# Patient Record
Sex: FEMALE | Race: WHITE
[De-identification: names, ages, dates, MRNs, and addresses within clinical notes are randomized per-mention and may not be internally consistent; named-entity substitution may affect disease eponyms.]

---

## 2019-02-18 ENCOUNTER — HOSPITAL ENCOUNTER (EMERGENCY)
Dept: HOSPITAL 56 - MW.ED | Age: 24
Discharge: HOME | End: 2019-02-18
Payer: COMMERCIAL

## 2019-02-18 DIAGNOSIS — Z98.890: ICD-10-CM

## 2019-02-18 DIAGNOSIS — M62.830: ICD-10-CM

## 2019-02-18 DIAGNOSIS — Z88.0: ICD-10-CM

## 2019-02-18 DIAGNOSIS — N39.0: Primary | ICD-10-CM

## 2019-02-18 PROCEDURE — 87086 URINE CULTURE/COLONY COUNT: CPT

## 2019-02-18 PROCEDURE — 81025 URINE PREGNANCY TEST: CPT

## 2019-02-18 PROCEDURE — 96372 THER/PROPH/DIAG INJ SC/IM: CPT

## 2019-02-18 PROCEDURE — 81001 URINALYSIS AUTO W/SCOPE: CPT

## 2019-02-18 PROCEDURE — 99283 EMERGENCY DEPT VISIT LOW MDM: CPT

## 2019-02-18 PROCEDURE — 72100 X-RAY EXAM L-S SPINE 2/3 VWS: CPT

## 2019-02-18 NOTE — CR
EXAMINATION: Lumbar spine

 

HISTORY: Pain

 

COMPARISON: None

 

TECHNIQUE: AP and lateral views

 

FINDINGS: The lumbar spinal alignment is normal. The vertebral body heights and

disc spaces appear well-maintained. Bone mineralization is normal. The SI joints

are symmetric.

 

IMPRESSION: Grossly unremarkable lumbar spine.

## 2019-02-18 NOTE — EDM.PDOC
ED HPI GENERAL MEDICAL PROBLEM





- General


Chief Complaint: Back Pain or Injury


Stated Complaint: BACK PAIN


Time Seen by Provider: 02/18/19 09:46


Source of Information: Reports: Patient





- History of Present Illness


INITIAL COMMENTS - FREE TEXT/NARRATIVE: 





HISTORY AND PHYSICAL:


History of present illness:


[Patient presents with 5 out of 10 back pain low back pain no known injury, she 

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 

seeing chiropractor several times over the last 10 days with minimal 

improvement she has clear muscle spasm over right and left paraspinous muscles 

and infraspinatus on the left





No fever nausea vomiting chills sweats no known injury no footdrop saddle 

anesthesia bowel or urine symptoms


]


Review of systems: 


As per history of present illness and below otherwise all systems reviewed and 

negative.


Past medical history: 


As per history of present illness and as reviewed below otherwise 

noncontributory.


Surgical history: 


As per history of present illness and as reviewed below otherwise 

noncontributory.


Social history: 


No reported history of drug or alcohol abuse.


Family history: 


As per history of present illness and as reviewed below otherwise 

noncontributory.





Physical exam:


HEENT: Atraumatic, normocephalic, pupils reactive, negative for conjunctival 

pallor or scleral icterus, mucous membranes moist, throat clear, neck supple, 

nontender, trachea midline.


Lungs: Clear to auscultation, breath sounds equal bilaterally, chest nontender.


Heart: S1S2, regular, negative for clicks, rubs, or JVD.


Abdomen: Soft, nondistended, nontender. Negative for masses or 

hepatosplenomegaly. Negative for costovertebral tenderness.


Pelvis: Stable nontender.


Genitourinary: Deferred.


Rectal: Deferred.


Extremities: Atraumatic, negative for cords or calf pain. Neurovascular 

unremarkable.


Neuro: Awake, alert, oriented. Cranial nerves II through XII unremarkable. 

Cerebellum unremarkable. Motor and sensory unremarkable throughout. Exam 

nonfocal.





Diagnostics:


[UA with hCG


Lumbar spine


]


Therapeutics:


[Toradol 60 IM





Cataflam


Flexeril


Macrobid


]


Impression: 


[ back pain ]


para spinous muscle spasm


uti


Definitive disposition and diagnosis as appropriate pending reevaluation and 

review of above.


  ** Back


Pain Score (Numeric/FACES): 9





- Related Data


 Allergies











Allergy/AdvReac Type Severity Reaction Status Date / Time


 


Penicillins Allergy  throat Verified 02/18/19 09:40





   closes  











Home Meds: 


 Home Meds





. [No Known Home Meds]  02/18/19 [History]











Past Medical History


HEENT History: Reports: Other (See Below)


Other HEENT History: wears glasses/contacts


Musculoskeletal History: Reports: Fracture


Other Musculoskeletal History: hx fx toes, fingers, wrist, and nose


Neurological History: Reports: Migraines


Psychiatric History: Reports: Anxiety, Depression





- Past Surgical History


Head Surgeries/Procedures: Reports: None


HEENT Surgical History: Reports: Oral Surgery, Tonsillectomy





ED ROS GENERAL





- Review of Systems


Review Of Systems: See Below





ED EXAM, GENERAL





- Physical Exam


Exam: See Below





Course





- Vital Signs


Last Recorded V/S: 


 Last Vital Signs











Temp  98.1 F   02/18/19 09:41


 


Pulse  75   02/18/19 09:41


 


Resp  18   02/18/19 09:41


 


BP  118/80   02/18/19 09:41


 


Pulse Ox  98   02/18/19 09:41














- Orders/Labs/Meds


Orders: 


 Active Orders 24 hr











 Category Date Time Status


 


 CULTURE URINE [RM] Stat Lab  02/18/19 10:07 Received











Labs: 


 Laboratory Tests











  02/18/19 02/18/19 Range/Units





  10:07 10:07 


 


Urine Color  YELLOW   


 


Urine Appearance  CLEAR   


 


Urine pH  6.0   (5.0-8.0)  


 


Ur Specific Gravity  1.010   (1.001-1.035)  


 


Urine Protein  NEGATIVE   (NEGATIVE)  mg/dL


 


Urine Glucose (UA)  NEGATIVE   (NEGATIVE)  mg/dL


 


Urine Ketones  NEGATIVE   (NEGATIVE)  mg/dL


 


Urine Occult Blood  NEGATIVE   (NEGATIVE)  


 


Urine Nitrite  NEGATIVE   (NEGATIVE)  


 


Urine Bilirubin  NEGATIVE   (NEGATIVE)  


 


Urine Urobilinogen  0.2   (<2.0)  EU/dL


 


Ur Leukocyte Esterase  TRACE H   (NEGATIVE)  


 


Urine RBC  0-2   (0-2/HPF)  


 


Urine WBC  2-5   (0-5/HPF)  


 


Ur Epithelial Cells  MANY   (NONE-FEW)  


 


Urine Bacteria  2+ H   (NEGATIVE)  


 


Urine HCG, Qual   NEGATIVE  (NEGATIVE)  











Meds: 


Medications














Discontinued Medications














Generic Name Dose Route Start Last Admin





  Trade Name Freq  PRN Reason Stop Dose Admin


 


Ketorolac Tromethamine  60 mg  02/18/19 09:46  02/18/19 10:05





  Toradol  IM  02/18/19 09:47  60 mg





  ONETIME ONE   Administration





     





     





     





     














Departure





- Departure


Time of Disposition: 10:53


Disposition: Home, Self-Care 01


Condition: Good


Clinical Impression: 


 Spasm of lumbar paraspinous muscle, UTI (urinary tract infection)








- Discharge Information


Referrals: 


PCP,None [Primary Care Provider] - 


Forms:  ED Department Discharge


Additional Instructions: 


Medication as prescribed


Return if symptoms persist or worsen


Over-the-counter symptomatic therapy is discussed


Follow-up with primary care in 2 weeks sooner as needed





[facility] - Primary Care


[address] [place] [id number]


Phone: [phone]


Fax: [phone]








The following information is given to patients seen in the emergency department 

who are being discharged to home. This information is to outline your options 

for follow-up care. We provide all patients seen in our emergency department 

with a follow-up referral.





The need for follow-up, as well as the timing and circumstances, are variable 

depending upon the specifics of your emergency department visit.





If you don't have a primary care physician on staff, we will provide you with a 

referral. We always advise you to contact your personal physician following an 

emergency department visit to inform them of the circumstance of the visit and 

for follow-up with them and/or the need for any referrals to a consulting 

specialist.





The emergency department will also refer you to a specialist when appropriate. 

This referral assures that you have the opportunity for follow-up care with a 

specialist. All of these measure are taken in an effort to provide you with 

optimal care, which includes your follow-up.





Under all circumstances we always encourage you to contact your private 

physician who remains a resource for coordinating your care. When calling for 

follow-up care, please make the office aware that this follow-up is from your 

recent emergency room visit. If for any reason you are refused follow-up, 

please contact the [facility] emergency department at [phone] 

and asked to speak to the emergency department charge nurse.








- My Orders


Last 24 Hours: 


My Active Orders





02/18/19 10:07


CULTURE URINE [RM] Stat 














- Assessment/Plan


Last 24 Hours: 


My Active Orders





02/18/19 10:07


CULTURE URINE [RM] Stat

## 2020-04-09 ENCOUNTER — HOSPITAL ENCOUNTER (INPATIENT)
Dept: HOSPITAL 56 - MW.OB | Age: 25
LOS: 2 days | Discharge: HOME | DRG: 560 | End: 2020-04-11
Attending: OBSTETRICS & GYNECOLOGY | Admitting: OBSTETRICS & GYNECOLOGY
Payer: COMMERCIAL

## 2020-04-09 DIAGNOSIS — Z3A.40: ICD-10-CM

## 2020-04-09 DIAGNOSIS — O48.0: Primary | ICD-10-CM

## 2020-04-09 DIAGNOSIS — Z87.891: ICD-10-CM

## 2020-04-09 NOTE — PCM.PNLD
Labor Progress Note





- VS & Meds


Vital Signs: 


T 98.6F; HR 97; /52 (105), Pain 4/10





Active Medications: 


 Current Medications





Butorphanol Tartrate (Stadol)  1 mg IVPUSH Q1H PRN


   PRN Reason: Pain


Carboprost Tromethamine (Hemabate Ds)  250 mcg IM ASDIRECTED PRN


   PRN Reason: Post Partum Hemorrhage


Lactated Ringer's (Ringers, Lactated)  1,000 mls @ 150 mls/hr IV ASDIRECTED AINSLEY


   Last Admin: 04/09/20 16:48 Dose:  150 mls/hr


Oxytocin/Sodium Chloride (Oxytocin 30 Unit/500 Ml-Ns)  30 unit in 500 mls @ 999 

mls/hr IV TITRATE AINSLEY


Oxytocin/Sodium Chloride (Oxytocin 30 Unit/500 Ml-Ns)  30 unit in 500 mls @ 2 

mls/hr IV TITRATE AINSLEY; Protocol


Tranexamic Acid 1,000 mg/ (Sodium Chloride)  110 mls @ 660 mls/hr IV ONETIME PRN


   PRN Reason: Bleeding


Lidocaine HCl (Xylocaine 1%)  50 ml INJECT ONETIME PRN


   PRN Reason: Laceration repair


Methylergonovine Maleate (Methergine)  0.2 mg IM ASDIRECTED PRN


   PRN Reason: Post Partum Hemorrhage


Misoprostol (Cytotec)  200 mcg PO ONETIME PRN


   PRN Reason: Post Partum Hemorrhage


Misoprostol (Cytotec)  25 mcg VAG ONETIME PRN


   PRN Reason: Cervical Ripening


   Last Admin: 04/09/20 01:02 Dose:  25 mcg


Misoprostol (Cytotec)  25 mcg VAG Q4H PRN


   PRN Reason: Cervical Ripening


Morphine Sulfate (Morphine)  5 mg IVPUSH ONETIME PRN


   PRN Reason: Pain


   Last Admin: 04/09/20 16:42 Dose:  5 mg


Nalbuphine HCl (Nubain)  10 mg IVPUSH Q1H PRN


   PRN Reason: Pain (severe 7-10)


Promethazine HCl (Phenergan)  25 mg IM ONETIME PRN


   PRN Reason: Pain


   Last Admin: 04/09/20 16:40 Dose:  25 mg


Sodium Chloride (Saline Flush)  10 ml FLUSH ASDIRECTED PRN


   PRN Reason: Keep Vein Open


Sodium Chloride (Saline Flush)  2.5 ml FLUSH ASDIRECTED PRN


   PRN Reason: Keep Vein Open


Sodium Chloride (Normal Saline)  10 ml IV ASDIRECTED PRN


   PRN Reason: IV Use


Sterile Water (Sterile Water For Irrigation)  1,000 ml IRR ASDIRECTED PRN


   PRN Reason: delivery


Terbutaline Sulfate (Brethine)  0.25 mg SUBCUT ASDIRECTED PRN


   PRN Reason: Tacysystole











- Uterine Contractions


Uterine Monitoring Mode: External [place]


Contraction Frequency (min): 3-5


Contraction Duration (sec): 60-90


Contraction Intensity: Moderate


Uterine Resting Tone: Soft





- Fetal Monitoring


Fetal Monitor Mode: External Ultrasound


Fetal Heart Rate (FHR) Baseline: 135


Fetal Heart Rate (FHR) Variability: Moderate (6-25 bmp)


Fetal Accelerations: Present, 15x15


Fetal Decelerations: None, Variable


Fetal Strip Review: Category I





- Vaginal Exam


Dilation (cm): 4


Effacement (Percent): 100


Station: 0


Cervical Position: Midposition


Sterile Vaginal Exam Performed By: Tatyana Rogers


Vaginal Exam Comment: Cook catheter spontaneously expulled.  SVE 4/100/0.  

Uneventful AROM for augementation of labor.  Large clear fluid.  Fetal head 

well applied.  Tolerated well by fetus.  , moderate variability, 

positive accels.  Possible FHR deceleration with maternal position change to 

upright sitting position, maternal position change to semi-fowlers and EFM 

adjustment showed spontaneous recovery.  Continue with IOL plan of care.

## 2020-04-09 NOTE — PCM.PREANE
Preanesthetic Assessment





- Anesthesia/Transfusion/Family Hx


Anesthesia History: Prior Anesthesia Without Reaction


Family History of Anesthesia Reaction: No


Transfusion History: No Prior Transfusion(s)





- Physical Assessment


NPO Status Date: 04/09/20


NPO Status Time: 18:00


Height: 1.66 m


Weight: 91.626 kg


ASA Class: 1





- Lab


Values: 





 Laboratory Last Values











WBC  10.66 K/uL (4.0-11.0)   04/09/20  00:30    


 


RBC  4.30 M/uL (4.30-5.90)   04/09/20  00:30    


 


Hgb  12.5 g/dL (12.0-16.0)   04/09/20  00:30    


 


Hct  37.1 % (36.0-46.0)   04/09/20  00:30    


 


MCV  86.3 fL (80.0-98.0)   04/09/20  00:30    


 


MCH  29.1 pg (27.0-32.0)   04/09/20  00:30    


 


MCHC  33.7 g/dL (31.0-37.0)   04/09/20  00:30    


 


RDW Std Deviation  42.0 fl (28.0-62.0)   04/09/20  00:30    


 


RDW Coeff of Clau  14 % (11.0-15.0)   04/09/20  00:30    


 


Plt Count  188 K/uL (150-400)   04/09/20  00:30    


 


MPV  12.40 fL (7.40-12.00)  H  04/09/20  00:30    


 


Blood Type  O POSITIVE   04/09/20  00:30    


 


Antibody Screen  NEGATIVE   04/09/20  00:30    














- Allergies


Allergies/Adverse Reactions: 


 Allergies











Allergy/AdvReac Type Severity Reaction Status Date / Time


 


Penicillins Allergy  throat Verified 02/18/19 09:40





   closes  














- Acknowledgements


Anesthesia Type Planned: Epidural


Pt an Appropriate Candidate for the Planned Anesthesia: Yes


Alternatives and Risks of Anesthesia Discussed w Pt/Guardian: Yes


Pt/Guardian Understands and Agrees with Anesthesia Plan: Yes


Additional Comments: 





Mirlande has small nose ring and also has atongue stud. I have asked her to 

remove tongue stud.





PreAnesthesia Questionnaire


HEENT History: Reports: Other (See Below)


Other HEENT History: wears glasses/contacts


OB/GYN History: Reports: Pregnancy


Musculoskeletal History: Reports: Fracture


Other Musculoskeletal History: hx fx toes, fingers, wrist, and nose


Neurological History: Reports: Migraines


Psychiatric History: Reports: Anxiety, Depression


Dermatologic History: Reports: Other (See Below)


Other Dermatologic History: gets hives





- Past Surgical History


Head Surgeries/Procedures: Reports: None


HEENT Surgical History: Reports: Oral Surgery, Tonsillectomy





- SUBSTANCE USE


Smoking Status *Q: Former Smoker


Tobacco Use Within Last Twelve Months: Cigarettes


Recreational Drug Use History: Yes


Recreational Drug Type: Reports: Marijuana/Hashish





- HOME MEDS


Home Medications: 


 Home Meds





. [No Known Home Meds]  02/18/19 [History]











- CURRENT (IN HOUSE) MEDS


Current Meds: 





 Current Medications





Butorphanol Tartrate (Stadol)  1 mg IVPUSH Q1H PRN


   PRN Reason: Pain


   Last Admin: 04/09/20 20:40 Dose:  1 mg


Carboprost Tromethamine (Hemabate Ds)  250 mcg IM ASDIRECTED PRN


   PRN Reason: Post Partum Hemorrhage


Lactated Ringer's (Ringers, Lactated)  1,000 mls @ 150 mls/hr IV ASDIRECTED AINSLEY


   Last Admin: 04/09/20 16:48 Dose:  150 mls/hr


Oxytocin/Sodium Chloride (Oxytocin 30 Unit/500 Ml-Ns)  30 unit in 500 mls @ 999 

mls/hr IV TITRATE AINSLEY


Oxytocin/Sodium Chloride (Oxytocin 30 Unit/500 Ml-Ns)  30 unit in 500 mls @ 2 

mls/hr IV TITRATE AINSLEY; Protocol


Tranexamic Acid 1,000 mg/ (Sodium Chloride)  110 mls @ 660 mls/hr IV ONETIME PRN


   PRN Reason: Bleeding


Lidocaine HCl (Xylocaine 1%)  50 ml INJECT ONETIME PRN


   PRN Reason: Laceration repair


Methylergonovine Maleate (Methergine)  0.2 mg IM ASDIRECTED PRN


   PRN Reason: Post Partum Hemorrhage


Misoprostol (Cytotec)  200 mcg PO ONETIME PRN


   PRN Reason: Post Partum Hemorrhage


Misoprostol (Cytotec)  25 mcg VAG ONETIME PRN


   PRN Reason: Cervical Ripening


   Last Admin: 04/09/20 01:02 Dose:  25 mcg


Misoprostol (Cytotec)  25 mcg VAG Q4H PRN


   PRN Reason: Cervical Ripening


Morphine Sulfate (Morphine)  5 mg IVPUSH ONETIME PRN


   PRN Reason: Pain


   Last Admin: 04/09/20 16:42 Dose:  5 mg


Nalbuphine HCl (Nubain)  10 mg IVPUSH Q1H PRN


   PRN Reason: Pain (severe 7-10)


Promethazine HCl (Phenergan)  25 mg IM ONETIME PRN


   PRN Reason: Pain


   Last Admin: 04/09/20 16:40 Dose:  25 mg


Sodium Chloride (Saline Flush)  10 ml FLUSH ASDIRECTED PRN


   PRN Reason: Keep Vein Open


Sodium Chloride (Saline Flush)  2.5 ml FLUSH ASDIRECTED PRN


   PRN Reason: Keep Vein Open


Sodium Chloride (Normal Saline)  10 ml IV ASDIRECTED PRN


   PRN Reason: IV Use


Sterile Water (Sterile Water For Irrigation)  1,000 ml IRR ASDIRECTED PRN


   PRN Reason: delivery


Terbutaline Sulfate (Brethine)  0.25 mg SUBCUT ASDIRECTED PRN


   PRN Reason: Tacysystole





Discontinued Medications





Fentanyl (Sublimaze) Confirm Administered Dose 100 mcg .ROUTE .STK-MED ONE


   Stop: 04/09/20 21:20


Ropivacaine (Naropin 0.2%) Confirm Administered Dose 100 mls @ as directed 

.ROUTE .STK-MED ONE


   Stop: 04/09/20 21:20

## 2020-04-09 NOTE — PCM.PNLD
Labor Progress Note





- VS & Meds


Vital Signs: 


/83, HR 85, T 98.2, Pain 5/10 intermittent uterine cramping





Active Medications: 


 Current Medications





Butorphanol Tartrate (Stadol)  1 mg IVPUSH Q1H PRN


   PRN Reason: Pain


Carboprost Tromethamine (Hemabate Ds)  250 mcg IM ASDIRECTED PRN


   PRN Reason: Post Partum Hemorrhage


Lactated Ringer's (Ringers, Lactated)  1,000 mls @ 150 mls/hr IV ASDIRECTED AINSLEY


Oxytocin/Sodium Chloride (Oxytocin 30 Unit/500 Ml-Ns)  30 unit in 500 mls @ 999 

mls/hr IV TITRATE AINSLEY


Oxytocin/Sodium Chloride (Oxytocin 30 Unit/500 Ml-Ns)  30 unit in 500 mls @ 2 

mls/hr IV TITRATE AINSLEY; Protocol


Tranexamic Acid 1,000 mg/ (Sodium Chloride)  110 mls @ 660 mls/hr IV ONETIME PRN


   PRN Reason: Bleeding


Lidocaine HCl (Xylocaine 1%)  50 ml INJECT ONETIME PRN


   PRN Reason: Laceration repair


Methylergonovine Maleate (Methergine)  0.2 mg IM ASDIRECTED PRN


   PRN Reason: Post Partum Hemorrhage


Misoprostol (Cytotec)  200 mcg PO ONETIME PRN


   PRN Reason: Post Partum Hemorrhage


Misoprostol (Cytotec)  25 mcg VAG ONETIME PRN


   PRN Reason: Cervical Ripening


   Last Admin: 04/09/20 01:02 Dose:  25 mcg


Misoprostol (Cytotec)  25 mcg VAG Q4H PRN


   PRN Reason: Cervical Ripening


Nalbuphine HCl (Nubain)  10 mg IVPUSH Q1H PRN


   PRN Reason: Pain (severe 7-10)


Sodium Chloride (Saline Flush)  10 ml FLUSH ASDIRECTED PRN


   PRN Reason: Keep Vein Open


Sodium Chloride (Saline Flush)  2.5 ml FLUSH ASDIRECTED PRN


   PRN Reason: Keep Vein Open


Sodium Chloride (Normal Saline)  10 ml IV ASDIRECTED PRN


   PRN Reason: IV Use


Sterile Water (Sterile Water For Irrigation)  1,000 ml IRR ASDIRECTED PRN


   PRN Reason: delivery


Terbutaline Sulfate (Brethine)  0.25 mg SUBCUT ASDIRECTED PRN


   PRN Reason: Tacysystole











- Uterine Contractions


Uterine Monitoring Mode: External [place]


Contraction Frequency (min): 1-6


Contraction Duration (sec): 60


Contraction Intensity: Mild


Uterine Resting Tone: Soft





- Fetal Monitoring


Fetal Monitor Mode: External Ultrasound


Fetal Heart Rate (FHR) Baseline: 135


Fetal Heart Rate (FHR) Variability: Moderate (6-25 bmp)


Fetal Accelerations: Present, 15x15


Fetal Decelerations: Variable


Fetal Strip Review: Category II





- Vaginal Exam


Dilation (cm): 1


Effacement (Percent): 70


Station: -2


Cervical Position: Midposition


Sterile Vaginal Exam Performed By: Tatyana Rogers


Vaginal Exam Comment: FHR Cat II.  [employer] catheter placed for cervical ripening, 

sterile NS 60 ml/60 ml.  Patient tolerated well.  Membranes intact.  FHR Cat I S

/P Cook cath placement.  OK to intermittently monitoring, obtain FHTs q hour.  

Notify provider if status changes.

## 2020-04-09 NOTE — PCM.LDHP
L&D History of Present Illness





- General


Date of Service: 20


Admit Problem/Dx: 


 Admission Diagnosis/Problem











Admission Diagnosis/Problem    














20 00:22


23 yo  presenting to L&D with her partner Aurelio for induction of labor at 40 

1/7 weeks.  O+, Rubella equivocal, GBS negative


Source of Information: Patient


History Limitations: Reports: No Limitations





- Related Data


Allergies/Adverse Reactions: 


 Allergies











Allergy/AdvReac Type Severity Reaction Status Date / Time


 


Penicillins Allergy  throat Verified 19 09:40





   closes  











Home Medications: 


 Home Meds





. [No Known Home Meds]  19 [History]











Past Medical History


HEENT History: Reports: Other (See Below)


Other HEENT History: wears glasses/contacts


Musculoskeletal History: Reports: Fracture


Other Musculoskeletal History: hx fx toes, fingers, wrist, and nose


Neurological History: Reports: Migraines


Psychiatric History: Reports: Anxiety, Depression





- Past Surgical History


Head Surgeries/Procedures: Reports: None


HEENT Surgical History: Reports: Oral Surgery, Tonsillectomy





Social & Family History





- Family History


Family Medical History: Noncontributory





- Caffeine Use


Caffeine Use: Reports: Coffee, Energy Drinks, Soda, Tea





H&P Review of Systems





- Review of Systems:


Review Of Systems: See Below


General: Reports: No Symptoms


HEENT: Reports: No Symptoms


Pulmonary: Reports: No Symptoms


Cardiovascular: Reports: No Symptoms


Gastrointestinal: Reports: No Symptoms


Genitourinary: Reports: No Symptoms


Musculoskeletal: Reports: No Symptoms


Skin: Reports: No Symptoms


Psychiatric: Reports: No Symptoms


Neurological: Reports: No Symptoms


Hematologic/Lymphatic: Reports: No Symptoms


Immunologic: Reports: No Symptoms





L&D Exam





- Exam


Exam: See Below





- OB Specific


Fetal Movement: Active


Fetal Heart Tones: Present





- Cisneros Score


Cisneros Score Cervix Position: Posterior


Cisneros Score Consistency: Soft


Cisneros Score Effacement: 31-50%


Cisneros Score Dilation: 1-2 cm


Cisneros Score Infant's Station: -2


Cisneros Score Total: 5





- Exam


General: Alert, Oriented, Cooperative


HEENT: Hearing Intact


Lungs: Normal Respiratory Effort


GI/Abdominal Exam: Soft, Non-Tender


Rectal Exam: Deferred


Genitourinary: Normal external exam


Back Exam: Normal Inspection, Full Range of Motion


Extremities: Normal Inspection, Normal Range of Motion, Non-Tender, Normal 

Capillary Refill


Skin: Warm, Dry, Intact


Neurological: Strength Equal Bilateral, Normal Gait, Normal Speech, Normal Tone

, Sensation Intact


Psychiatric: Alert, Normal Affect, Normal Mood





- Problem List


(1) Supervision of normal IUP (intrauterine pregnancy) in primigravida


SNOMED Code(s): 27457312, 460706096, 084371838, 227057214


   ICD Code: Z34.00 - ENCNTR FOR SUPRVSN OF NORMAL FIRST PREGNANCY, UNSP 

TRIMESTER   Status: Acute   Priority: High   Current Visit: Yes   


Qualifiers: 


   Trimester: third trimester   Qualified Code(s): Z34.03 - Encounter for 

supervision of normal first pregnancy, third trimester   


Problem List Initiated/Reviewed/Updated: Yes


Assessment/Plan Comment:: 





Induction


A: 23 yo  presenting to L&D with her partner Aurelio for induction of labor at 

40 1/7 weeks.  O+, Rubella equivocal, GBS negative, SVE 1/50%/-2, soft, 

posterior


P: Admit IOL, cytotec to pitocin, anticipate , Dr. Jyoti plasencia

## 2020-04-09 NOTE — PCM.PRNOTE
- Free Text/Narrative


Note: 





Anes Note





Patient requests epidural for L&D. Sitting position, level L4-L5 midline 

approach. Sterile technique.





Chloraprep scrub to lumbar area. Sterile fenestrated drape applied.





Epidural space achieved sdeond attempt using LEONEL technique. LEONEL at 5 cm. Cath 

threaded 5 cm with ease. Cath secure at 11 cm monik using sterile clear adhesive 

dressing. 





Test 0935 3 cc 1.5% lido with epi negative.





0938 LOad 10 cc 0.2% ropiv with 1 mcg cc fentnayl in slow divided doses.





0943 Pump started with 90 cc same solution. Rate is 8 cc hr with 6 cc q 20 min 

prn bolus.





Elver well.





Time with patient [id number]





Steven Lemus CRNA

## 2020-04-10 PROCEDURE — 00HU33Z INSERTION OF INFUSION DEVICE INTO SPINAL CANAL, PERCUTANEOUS APPROACH: ICD-10-PCS | Performed by: NURSE ANESTHETIST, CERTIFIED REGISTERED

## 2020-04-10 PROCEDURE — 0HQ9XZZ REPAIR PERINEUM SKIN, EXTERNAL APPROACH: ICD-10-PCS | Performed by: OBSTETRICS & GYNECOLOGY

## 2020-04-10 PROCEDURE — 3E0R3BZ INTRODUCTION OF ANESTHETIC AGENT INTO SPINAL CANAL, PERCUTANEOUS APPROACH: ICD-10-PCS | Performed by: NURSE ANESTHETIST, CERTIFIED REGISTERED

## 2020-04-10 PROCEDURE — 10907ZC DRAINAGE OF AMNIOTIC FLUID, THERAPEUTIC FROM PRODUCTS OF CONCEPTION, VIA NATURAL OR ARTIFICIAL OPENING: ICD-10-PCS | Performed by: OBSTETRICS & GYNECOLOGY

## 2020-04-10 NOTE — PCM.DEL
L & D Note





- General Info


Date of Service: 04/10/20


Mother's Due Date: 20





- Delivery Note


Labor: Augmented by ARM, Augmented by Oxytocin


Cervical Ripening Method: Balloon Device, Prostaglandin E2


Delivery Outcome: Livebirth


Infant Delivery Method: Spontaneous Vaginal Delivery-Single


Infant Delivery Mode: Spontaneous


Birth Presentation: Left Occiput Anterior (CAITY)


Nuchal Cord: None


Anesthesia Type: Epidural


Episiotomy Type: None


Laceration: 1st Degree (1st degree skin tear to left labia minor, hemostatic, 

not repaired.)


Placenta: Intact, Spontaneous


Cord: 3 Vessels


Estimated Blood Loss: 250


Resuscitation Needed: No


: Suctioned, Bulb Syringe, Stimulated, Warmed, [place] Used


APGAR Score 1 min: 8


APGAR Score 5 min: 9


Second Stage Interventions: Reports: Encouragement Given, Pushing Effectively, 

Pushing, Feet in Foot Rests


Delivery Comments (Free Text/Narrative):: 


Jarrod is a 25 yo  S/P uncomplicated  at 40.2 weeks gestation to viable

, term NBM with spontaneous cries with skin-to-skin, drying, and stimulating.  

Apgars 8/9.  NBM placed on maternal abdomen, delayed cord clamping x 4 min.  

Umbilical cord clamped x 2 and cut by FOB.  Placenta delivered without incident

, intact, Bains, 3VC.  Perineum intact, 1st degree skin tear to left labia 

minora, hemostatic, not repaired.  NBM on maternal chest, both mother and baby 

resting in bed.  








Induction Criteria





- Cisneros Score


Cisneros Score Dilation: 1-2 cm


Cisneros Score Effacement: 60-70%


Cisneros Score Infant's Station: -2


Cisneros Score Consistency: Medium


Cisneros Score Cervix Position: Midposition


Cisneros Score Total: 6


Cisneros Score Presenting Part: Reports: Cephalic





- Induction


Gestational Age >/= 39 wks: Yes


Medical Indication: 


Pregnancy-induced hypertension





Estimated Pelvis: Reports: Adequate


Reassuring Fetal Monitoring Strip: Yes


Absence of Tachy Systole: Yes





- General Info


Date of Service: 04/10/20


Admission Dx/Problem (Free Text): 


 Admission Diagnosis/Problem











Admission Diagnosis/Problem    














20 00:22


25 yo  presenting to L&D with her partner Aurelio for induction of labor at 40 

1/7 weeks.  O+, Rubella equivocal, GBS negative


Functional Status: Reports: Pain Controlled





- Review of Systems


General: Reports: No Symptoms


HEENT: Reports: No Symptoms


Pulmonary: Reports: No Symptoms


Cardiovascular: Reports: No Symptoms


Gastrointestinal: Reports: No Symptoms


Genitourinary: Reports: No Symptoms


Musculoskeletal: Reports: No Symptoms


Skin: Reports: No Symptoms


Neurological: Reports: No Symptoms


Psychiatric: Reports: No Symptoms





- Patient Data


Weight - Most Recent: 202 lb


Med Orders - Current: 


 Current Medications





Butorphanol Tartrate (Stadol)  1 mg IVPUSH Q1H PRN


   PRN Reason: Pain


   Last Admin: 20 20:40 Dose:  1 mg


Carboprost Tromethamine (Hemabate Ds)  250 mcg IM ASDIRECTED PRN


   PRN Reason: Post Partum Hemorrhage


Lactated Ringer's (Ringers, Lactated)  1,000 mls @ 150 mls/hr IV ASDIRECTED A[facility]LEY


   Last Admin: 20 16:48 Dose:  150 mls/hr


Oxytocin/Sodium Chloride (Oxytocin 30 Unit/500 Ml-Ns)  30 unit in 500 mls @ 999 

mls/hr IV TITRATE A[facility]LEY


Oxytocin/Sodium Chloride (Oxytocin 30 Unit/500 Ml-Ns)  30 unit in 500 mls @ 2 

mls/hr IV TITRATE [facility]; Protocol


Tranexamic Acid 1,000 mg/ (Sodium Chloride)  110 mls @ 660 mls/hr IV ONETIME PRN


   PRN Reason: Bleeding


Lidocaine HCl (Xylocaine 1%)  50 ml INJECT ONETIME PRN


   PRN Reason: Laceration repair


Methylergonovine Maleate (Methergine)  0.2 mg IM ASDIRECTED PRN


   PRN Reason: Post Partum Hemorrhage


Misoprostol (Cytotec)  200 mcg PO ONETIME PRN


   PRN Reason: Post Partum Hemorrhage


Misoprostol (Cytotec)  25 mcg VAG ONETIME PRN


   PRN Reason: Cervical Ripening


   Last Admin: 20 01:02 Dose:  25 mcg


Misoprostol (Cytotec)  25 mcg VAG Q4H PRN


   PRN Reason: Cervical Ripening


Morphine Sulfate (Morphine)  5 mg IVPUSH ONETIME PRN


   PRN Reason: Pain


   Last Admin: 20 16:42 Dose:  5 mg


Nalbuphine HCl (Nubain)  10 mg IVPUSH Q1H PRN


   PRN Reason: Pain (severe 7-10)


Promethazine HCl (Phenergan)  25 mg IM ONETIME PRN


   PRN Reason: Pain


   Last Admin: 20 16:40 Dose:  25 mg


Sodium Chloride (Saline Flush)  10 ml FLUSH ASDIRECTED PRN


   PRN Reason: Keep Vein Open


Sodium Chloride (Saline Flush)  2.5 ml FLUSH ASDIRECTED PRN


   PRN Reason: Keep Vein Open


Sodium Chloride (Normal Saline)  10 ml IV ASDIRECTED PRN


   PRN Reason: IV Use


Sterile Water (Sterile Water For Irrigation)  1,000 ml IRR ASDIRECTED PRN


   PRN Reason: delivery


Terbutaline Sulfate (Brethine)  0.25 mg SUBCUT ASDIRECTED PRN


   PRN Reason: Tacysystole





Discontinued Medications





Fentanyl (Sublimaze) Confirm Administered Dose 100 mcg .ROUTE .STK-MED ONE


   Stop: 20 21:20


Ropivacaine (Naropin 0.2%) Confirm Administered Dose 100 mls @ as directed 

.ROUTE .STK-MED ONE


   Stop: 20 21:20











- Exam


General: Alert, Oriented


HEENT: Pupils Equal, Pupils Reactive, Mucous Membr. Moist/Pink


Neck: Supple


Lungs: Clear to Auscultation, Normal Respiratory Effort


Cardiovascular: Regular Rate, Regular Rhythm


GI/Abdominal Exam: Normal Bowel Sounds, Soft, Non-Tender, No Organomegaly, No 

Distention, Other (Firm, U-1)


 (Female) Exam: Normal External Exam, Vaginal Bleeding (Small to moderate 

rubra lochia, no clots)


Back Exam: Normal Inspection, Full Range of Motion


Extremities: Normal Inspection, Normal Range of Motion, Non-Tender, No Pedal 

Edema, Normal Capillary Refill


Skin: Warm, Dry, Intact


Wound/Incisions: Healing Well


Neurological: No New Focal Deficit


Psy/Mental Status: Alert, Normal Affect, Normal Mood





- Problem List & Annotations


(1)  (normal spontaneous vaginal delivery)


SNOMED Code(s): 30888498, 646501183


   Code(s): O80 - ENCOUNTER FOR FULL-TERM UNCOMPLICATED DELIVERY   Status: 

Acute   Priority: High   Current Visit: Yes   





(2) Hypertension affecting pregnancy in third trimester


SNOMED Code(s): 683717409, 780430556


   Code(s): O16.3 - UNSPECIFIED MATERNAL HYPERTENSION, THIRD TRIMESTER   Status

: Acute   Priority: High   Current Visit: Yes   





- Problem List Review


Problem List Initiated/Reviewed/Updated: Yes





- My Orders


Last 24 Hours: 


My Active Orders





20 12:48


Promethazine [Phenergan]   25 mg IM ONETIME PRN 





20 12:51


Morphine   5 mg IVPUSH ONETIME PRN 














- Assessment


Assessment:: 


Uncomplicated  to viable, term NBM








- Plan


Plan:: 


Continue to normal postpartum plan of care.  See new orders.

## 2020-04-11 LAB
BUN SERPL-MCNC: 9 MG/DL (ref 7–18)
CHLORIDE SERPL-SCNC: 106 MMOL/L (ref 98–107)
CO2 SERPL-SCNC: 29.2 MMOL/L (ref 21–32)
GLUCOSE SERPL-MCNC: 79 MG/DL (ref 74–106)
POTASSIUM SERPL-SCNC: 3.7 MMOL/L (ref 3.5–5.1)
SODIUM SERPL-SCNC: 143 MMOL/L (ref 136–145)

## 2020-04-11 NOTE — PCM.DCSUM1
**Discharge Summary





- Hospital Course


Free Text/Narrative:: 





Discharge home with infant. Follow up 1 week for blood pressure check and 6 

weeks for postpartum visit.


Diagnosis: Stroke: No


Modified [place] Scale: No Symptoms at All


Modified [place] Scale Score: 0





- Discharge Data


Discharge Date: 20


Discharge Disposition: Home, Self-Care 01


Condition: Good





- Referral to Home Health


Primary Care Physician: 


PCP None








- Discharge Diagnosis/Problem(s)


(1)  (normal spontaneous vaginal delivery)


SNOMED Code(s): 19997509, 440023521


   ICD Code: O80 - ENCOUNTER FOR FULL-TERM UNCOMPLICATED DELIVERY   Status: 

Acute   Priority: High   Current Visit: Yes   





- Patient Instructions


Diet: Usual Diet as Tolerated


Activity: As Tolerated, No Strenuous Activities, Rest and Relax Today


Driving: May Drive Today


Showering/Bathing: May Shower


Notify Provider of: Fever, Increased Pain, Swelling and Redness, Nausea and/or 

Vomiting


Other/Special Instructions: Discharge home with infant. Follow up 1 week for 

blood pressure check and 6 weeks for postpartum visit.





- Discharge Plan


*PRESCRIPTION DRUG MONITORING PROGRAM REVIEWED*: Not Applicable


*COPY OF PRESCRIPTION DRUG MONITORING REPORT IN PATIENT IRVIN: Not Applicable


Home Medications: 


 Home Meds





. [No Known Home Meds]  19 [History]








Oxygen Therapy Mode: Room Air


Referrals: 


[facility] [Outside]


Grisel Glynn CNM [Mid-Wife] - 20 2:15 pm





- Discharge Summary/Plan Comment


DC Time >30 min.: Yes





- General Info


Date of Service: 20


Admission Dx/Problem (Free Text: 


 Admission Diagnosis/Problem











Admission Diagnosis/Problem    














20 00:22


25 yo  presenting to L&D with her partner Aurelio for induction of labor at 40 

1/7 weeks.  O+, Rubella equivocal, GBS negative


Functional Status: Reports: Pain Controlled, Tolerating Diet, Ambulating, 

Urinating





- Review of Systems


General: Reports: No Symptoms


HEENT: Reports: No Symptoms


Pulmonary: Reports: No Symptoms


Cardiovascular: Reports: No Symptoms


Gastrointestinal: Reports: No Symptoms


Genitourinary: Reports: No Symptoms


Musculoskeletal: Reports: No Symptoms


Skin: Reports: No Symptoms


Neurological: Reports: No Symptoms


Psychiatric: Reports: No Symptoms





- Patient Data


Vitals - Most Recent: 


 Last Vital Signs











Temp  36.6 C   20 04:28


 


Pulse  80   20 04:28


 


Resp  16   20 04:28


 


BP  123/73   20 04:28


 


Pulse Ox  98   20 04:28











Weight - Most Recent: 91.626 kg


Lab Results - Last 24 hrs: 


 Laboratory Results - last 24 hr











  20 Range/Units





  00:30 05:15 05:15 


 


WBC   12.06 H   (4.0-11.0)  K/uL


 


RBC   3.71 L   (4.30-5.90)  M/uL


 


Hgb   10.7 L   (12.0-16.0)  g/dL


 


Hct   32.9 L   (36.0-46.0)  %


 


MCV   88.7   (80.0-98.0)  fL


 


MCH   28.8   (27.0-32.0)  pg


 


MCHC   32.5   (31.0-37.0)  g/dL


 


RDW Std Deviation   47.2   (28.0-62.0)  fl


 


RDW Coeff of Clau   14   (11.0-15.0)  %


 


Plt Count   151   (150-400)  K/uL


 


MPV   11.30   (7.40-12.00)  fL


 


Neut % (Auto)   70.5   (48.0-80.0)  %


 


Lymph % (Auto)   21.9   (16.0-40.0)  %


 


Mono % (Auto)   6.3   (0.0-15.0)  %


 


Eos % (Auto)   1.1   (0.0-7.0)  %


 


Baso % (Auto)   0.2   (0.0-1.5)  %


 


Neut # (Auto)   8.5 H   (1.4-5.7)  K/uL


 


Lymph # (Auto)   2.6 H   (0.6-2.4)  K/uL


 


Mono # (Auto)   0.8   (0.0-0.8)  K/uL


 


Eos # (Auto)   0.1   (0.0-0.7)  K/uL


 


Baso # (Auto)   0.0   (0.0-0.1)  K/uL


 


Nucleated RBC %   0.0   /100WBC


 


Nucleated RBCs #   0   K/uL


 


Sodium    143  (136-145)  mmol/L


 


Potassium    3.7  (3.5-5.1)  mmol/L


 


Chloride    106  ()  mmol/L


 


Carbon Dioxide    29.2  (21.0-32.0)  mmol/L


 


BUN    9  (7.0-18.0)  mg/dL


 


Creatinine    0.8  (0.6-1.0)  mg/dL


 


Est Cr Clr Drug Dosing    99.54  mL/min


 


Estimated GFR (MDRD)    > 60.0  ml/min


 


Glucose    79  ()  mg/dL


 


Calcium    7.6 L  (8.5-10.1)  mg/dL


 


Total Bilirubin    0.2  (0.2-1.0)  mg/dL


 


AST    23  (15-37)  IU/L


 


ALT    15  (14-63)  IU/L


 


Alkaline Phosphatase    70  ()  U/L


 


Total Protein    5.2 L  (6.4-8.2)  g/dL


 


Albumin    2.2 L  (3.4-5.0)  g/dL


 


Globulin    3.0  (2.6-4.0)  g/dL


 


Albumin/Globulin Ratio    0.7 L  (0.9-1.6)  


 


RPR  Non-Reac    (Non-Reac)  











Med Orders - Current: 


 Current Medications





Acetaminophen (Tylenol Extra Strength)  500 mg PO Q4H PRN


   PRN Reason: Pain


Acetaminophen (Tylenol Extra Strength)  1,000 mg PO Q4H PRN


   PRN Reason: Pain


Al Hydroxide/Mg Hydroxide (Mag-Al Plus)  30 ml PO Q8H PRN


   PRN Reason: Heartburn


Benzocaine/Menthol (Dermoplast Pain Relief 20%-0.5% Spray)  78 gm TOP 

ASDIRECTED PRN


   PRN Reason: Perineal Comfort Measure


   Last Admin: 04/10/20 09:35 Dose:  1 canister


Bisacodyl (Dulcolax)  10 mg RECTAL ONETIME PRN


   PRN Reason: Constipation


Docusate Sodium (Colace)  100 mg PO BID PRN


   PRN Reason: Constipation


   Last Admin: 04/10/20 20:35 Dose:  100 mg


Emollient Ointment (Lansinoh Hpa)  0 gm TOP ASDIRECTED PRN


   PRN Reason: Sore Nipples


   Last Admin: 04/10/20 09:36 Dose:  7 g


Ibuprofen (Motrin)  400 mg PO Q4H PRN


   PRN Reason: Pain


Ibuprofen (Motrin)  800 mg PO Q6H PRN


   PRN Reason: Pain


   Last Admin: 04/10/20 23:14 Dose:  800 mg


Oxycodone HCl (Oxycodone)  5 mg PO Q2H PRN


   PRN Reason: Pain


Sodium Chloride (Saline Flush)  10 ml FLUSH ASDIRECTED PRN


   PRN Reason: Keep Vein Open


Sodium Chloride (Saline Flush)  2.5 ml FLUSH ASDIRECTED PRN


   PRN Reason: Keep Vein Open


Witch Hazel (Tucks)  1 pad TOP ASDIRECTED PRN


   PRN Reason: comfort care


   Last Admin: 04/10/20 09:36 Dose:  1 tub





Discontinued Medications





Butorphanol Tartrate (Stadol)  1 mg IVPUSH Q1H PRN


   PRN Reason: Pain


   Last Admin: 20 20:40 Dose:  1 mg


Carboprost Tromethamine (Hemabate Ds)  250 mcg IM ASDIRECTED PRN


   PRN Reason: Post Partum Hemorrhage


Fentanyl (Sublimaze) Confirm Administered Dose 100 mcg .ROUTE .STK-MED ONE


   Stop: 20 21:20


Lactated Ringer's (Ringers, Lactated)  1,000 mls @ 150 mls/hr IV ASDIRECTED AINSLEY


   Last Admin: 20 16:48 Dose:  150 mls/hr


Oxytocin/Sodium Chloride (Oxytocin 30 Unit/500 Ml-Ns)  30 unit in 500 mls @ 999 

mls/hr IV TITRATE [facility]


Oxytocin/Sodium Chloride (Oxytocin 30 Unit/500 Ml-Ns)  30 unit in 500 mls @ 2 

mls/hr IV TITRATE [facility]; Protocol


Tranexamic Acid 1,000 mg/ (Sodium Chloride)  110 mls @ 660 mls/hr IV ONETIME PRN


   PRN Reason: Bleeding


Ropivacaine (Naropin 0.2%) Confirm Administered Dose 100 mls @ as directed 

.ROUTE .STK-MED ONE


   Stop: 20 21:20


Lidocaine HCl (Xylocaine 1%)  50 ml INJECT ONETIME PRN


   PRN Reason: Laceration repair


Methylergonovine Maleate (Methergine)  0.2 mg IM ASDIRECTED PRN


   PRN Reason: Post Partum Hemorrhage


Misoprostol (Cytotec)  200 mcg PO ONETIME PRN


   PRN Reason: Post Partum Hemorrhage


Misoprostol (Cytotec)  25 mcg VAG ONETIME PRN


   PRN Reason: Cervical Ripening


   Last Admin: 20 01:02 Dose:  25 mcg


Misoprostol (Cytotec)  25 mcg VAG Q4H PRN


   PRN Reason: Cervical Ripening


Morphine Sulfate (Morphine)  5 mg IVPUSH ONETIME PRN


   PRN Reason: Pain


   Last Admin: 20 16:42 Dose:  5 mg


Nalbuphine HCl (Nubain)  10 mg IVPUSH Q1H PRN


   PRN Reason: Pain (severe 7-10)


Promethazine HCl (Phenergan)  25 mg IM ONETIME PRN


   PRN Reason: Pain


   Last Admin: 20 16:40 Dose:  25 mg


Sodium Chloride (Saline Flush)  10 ml FLUSH ASDIRECTED PRN


   PRN Reason: Keep Vein Open


Sodium Chloride (Saline Flush)  2.5 ml FLUSH ASDIRECTED PRN


   PRN Reason: Keep Vein Open


Sodium Chloride (Normal Saline)  10 ml IV ASDIRECTED PRN


   PRN Reason: IV Use


Sterile Water (Sterile Water For Irrigation)  1,000 ml IRR ASDIRECTED PRN


   PRN Reason: delivery


Terbutaline Sulfate (Brethine)  0.25 mg SUBCUT ASDIRECTED PRN


   PRN Reason: Tacysystole











- Exam


General: Reports: Alert, Oriented, Cooperative, No Acute Distress


Lungs: Reports: Normal Respiratory Effort


GI/Abdominal Exam: Soft, Non-Tender


 (Female) Exam: Deferred


Rectal (Female) Exam: Deferred


Back Exam: Reports: Normal Inspection, Full Range of Motion


Extremities: Normal Inspection, Normal Range of Motion, Non-Tender, No Pedal 

Edema


Skin: Reports: Warm, Dry, Intact


Neurological: Reports: No New Focal Deficit, Normal Gait, Normal Speech, Normal 

Tone, Strength Equal Bilateral, Sensation Intact


Psy/Mental Status: Reports: Alert, Normal Affect, Normal Mood

## 2021-01-14 ENCOUNTER — HOSPITAL ENCOUNTER (EMERGENCY)
Dept: HOSPITAL 56 - MW.ED | Age: 26
Discharge: HOME | End: 2021-01-14
Payer: COMMERCIAL
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
## 2021-01-14 DIAGNOSIS — Z88.0: ICD-10-CM

## 2021-01-14 DIAGNOSIS — S93.402A: Primary | ICD-10-CM

## 2021-01-14 PROCEDURE — 99283 EMERGENCY DEPT VISIT LOW MDM: CPT

## 2021-01-14 PROCEDURE — 73610 X-RAY EXAM OF ANKLE: CPT

## 2021-01-14 PROCEDURE — 29515 APPLICATION SHORT LEG SPLINT: CPT
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
## 2021-01-14 NOTE — CR
Indication:



Injury



Technique:



Three views of the left ankle



Comparison:



None



Findings:



There is no fracture or joint dislocation. The ankle joint is congruent. 

There is no appreciable joint effusion. Soft tissue edema is noted over the 

lateral malleolus.



Impression:



No acute osseous abnormality.



Dictated by Farida Ibrahim MD @ Jan 14 2021  7:55PM



Signed by Dr. Farida Ibrahim @ Jan 14 2021  7:56PM

## 2021-08-24 NOTE — PCM.LDHP
L&D History of Present Illness





- General


Date of Service: 21


Admit Problem/Dx: 


                           Admission Diagnosis/Problem











Admission Diagnosis/Problem    














Source of Information: Patient


History Limitations: Reports: No Limitations





- History of Present Illness


Introduction:: 


 presenting to L&D at 37 6/7 weeks for induction of labor for abnormal NST 

and BPP of 6/8.  


Patient was sent earlier today for NST and BPP.  NST was non-reactive and BPP 

was scored 6/8 (2 points off for abscence of breathing movement).


Patient was given orange juice and shortly after, NST had improved with some 

contractions.  However, fetus was not moving as usual, according to patient.  


Patient reported decreased movement in the past few days.  She also had 

contractions 3days ago, which eventually went away.  No bleeding, no LOF.  


O+, Rubella immune, GBS negative 


On presentation, patient was 1-2cm/50%/-3 per nurse report; vertex by Leopold's.










- Related Data


Allergies/Adverse Reactions: 


                                    Allergies











Allergy/AdvReac Type Severity Reaction Status Date / Time


 


amoxicillin Allergy  Other Verified 21 18:51


 


Penicillins Allergy  throat Verified 21 11:16





   closes  











Home Medications: 


                                    Home Meds





Prenatal No122/Iron/Folic Acid [Prenatal Multi Tablet] 1 tab PO DAILY 21 

[History]











Past Medical History


HEENT History: Reports: Other (See Below)


Other HEENT History: wears glasses/contacts


OB/GYN History: Reports: Pregnancy


Musculoskeletal History: Reports: Fracture


Other Musculoskeletal History: hx fx toes, fingers, wrist, and nose


Neurological History: Reports: Migraines


Psychiatric History: Reports: Anxiety, Depression


Dermatologic History: Reports: Other (See Below)


Other Dermatologic History: gets hives





- Past Surgical History


Head Surgeries/Procedures: Reports: None


HEENT Surgical History: Reports: Oral Surgery, Tonsillectomy





Social & Family History





- Family History


Family Medical History: No Pertinent Family History





- Caffeine Use


Caffeine Use: Reports: None





H&P Review of Systems





- Review of Systems:


Review Of Systems: See Below


General: Reports: No Symptoms


HEENT: Reports: No Symptoms


Pulmonary: Reports: No Symptoms


Cardiovascular: Reports: No Symptoms


Gastrointestinal: Reports: No Symptoms


Genitourinary: Reports: No Symptoms


Musculoskeletal: Reports: No Symptoms


Skin: Reports: No Symptoms


Psychiatric: Reports: No Symptoms


Neurological: Reports: No Symptoms


Hematologic/Lymphatic: Reports: No Symptoms


Immunologic: Reports: No Symptoms





L&D Exam





- Exam


Exam: See Below





- Vital Signs


Weight: 87.997 kg





- OB Specific


Contraction Intensity: Irritability


Fetal Movement: Active


Fetal Heart Tones: Present


Fetal Heart Rate (FHR) Variability: Moderate (6-25 bpm)


Birth Presentation: Vertex


Estimated Fetal Weight: 6.5lbs





- Cisneros Score


Cisneros Score Cervix Position: Posterior


Cisneros Score Consistency: Medium


Cisneros Score Effacement: 31-50%


Cisneros Score Dilation: 1-2 cm


Cisneros Score Infant's Station: -3


Cisneros Score Total: 3





- Exam


General: Alert, Oriented


Lungs: Normal Respiratory Effort


Cardiovascular: Regular Rate


GI/Abdominal Exam: Soft, Non-Tender


Extremities: Normal Inspection


Skin: Warm


Psychiatric: Alert, Normal Affect, Normal Mood





- Problem List


(1) Decreased fetal movement


SNOMED Code(s): 509572452


   ICD Code: O36.8190 - DECREASED FETAL MOVEMENTS, UNSP TRIMESTER, UNSP   

Status: Acute   Priority: High   Current Visit: Yes   





(2) Encounter for induction of labor


SNOMED Code(s): 898700940


   ICD Code: Z34.90 - ENCNTR FOR SUPRVSN OF NORMAL PREGNANCY, UNSP, UNSP 

TRIMESTER   Status: Acute   Current Visit: Yes   


Problem List Initiated/Reviewed/Updated: Yes


Assessment/Plan Comment:: 


 at 37 6/7 weeks admitted for induction of labor secondary to decreased 

fetal movement, abnormal NST and BPP of 6/8.  


O+, Rubella immune, GBS negative.


Cisneros score of 3. 


Will start with cervical ripening.


Discussed risks, benefits and off label use.


Will use pitocin PRN.


Epidural PRN

## 2021-08-25 NOTE — PCM.PNLD
Labor Progress Note





- VS & Meds


Active Medications: 


                               Current Medications





Butorphanol Tartrate (Butorphanol 1 Mg/Ml Sdv)  1 mg IVPUSH Q1H PRN


   PRN Reason: Pain (severe 7-10)


Carboprost Tromethamine (Carboprost Tromethamine 250 Mcg/1 Ml Amp)  250 mcg IM 

ASDIRECTED PRN


   PRN Reason: Post Partum Hemorrhage


Oxytocin/Sodium Chloride (Oxytocin 30 Unit/500 Ml-Ns)  30 unit in 500 mls @ 999 

mls/hr IV TITRATE AINSLEY


Tranexamic Acid 1,000 mg/ (Sodium Chloride)  110 mls @ 660 mls/hr IV ONETIME PRN


   PRN Reason: Bleeding


Oxytocin/Sodium Chloride (Oxytocin 30 Unit/500 Ml-Ns)  30 unit in 500 mls @ 2 

mls/hr IV TITRATE AINSLEY; Protocol


   Last Titration: 08/25/21 02:35 Dose:  0 munits/min, 0 mls/hr


   Documented by: 


Lactated Ringer's (Ringers, Lactated)  1,000 mls @ 150 mls/hr IV ASDIRECTED AINSLEY


   Last Admin: 08/24/21 23:33 Dose:  100 mls/hr


   Documented by: 


Lidocaine HCl (Lidocaine 1% 50 Ml Mdv)  50 ml INJECT ONETIME PRN


   PRN Reason: Laceration repair


Methylergonovine Maleate (Methylergonovine 0.2 Mg/1 Ml Amp)  0.2 mg IM 

ASDIRECTED PRN


   PRN Reason: Post Partum Hemorrhage


Misoprostol (Misoprostol 200 Mcg Tab)  200 mcg PO ONETIME PRN


   PRN Reason: Post Partum Hemorrhage


Misoprostol (Misoprostol 25 Mcg (1/4 Of 100 Mcg) Tab)  25 mcg VAG ONETIME PRN


   PRN Reason: Cervical Ripening


   Last Admin: 08/24/21 17:49 Dose:  25 mcg


   Documented by: 


Misoprostol (Misoprostol 25 Mcg (1/4 Of 100 Mcg) Tab)  25 mcg VAG Q4H PRN


   PRN Reason: Cervical Ripening


Misoprostol (Misoprostol 25 Mcg (1/4 Of 100 Mcg) Tab)  25 mcg PO ONETIME PRN


   PRN Reason: Cervical Ripening


   Last Admin: 08/24/21 17:49 Dose:  25 mcg


   Documented by: 


Misoprostol (Misoprostol 25 Mcg (1/4 Of 100 Mcg) Tab)  25 mcg PO Q4H PRN


   PRN Reason: Cervical Ripening


Nalbuphine HCl (Nalbuphine 10 Mg/1 Ml Vial)  10 mg IVPUSH Q1H PRN


   PRN Reason: Pain (severe 7-10)


Ondansetron HCl (Ondansetron 4 Mg/2 Ml Sdv)  4 mg IVPUSH Q4H PRN


   PRN Reason: Nausea/Vomiting


Sodium Chloride (Sodium Chloride 0.9% 10 Ml Syringe)  10 ml FLUSH ASDIRECTED PRN


   PRN Reason: Keep Vein Open


Sodium Chloride (Sodium Chloride 0.9% 2.5 Ml Syringe)  2.5 ml FLUSH ASDIRECTED 

PRN


   PRN Reason: Keep Vein Open


Sodium Chloride (Sodium Chloride 0.9% 10 Ml Sdv)  10 ml IV ASDIRECTED PRN


   PRN Reason: IV Use


Sterile Water (Water For Irrigation,Sterile 1,000 Ml Container)  1,000 ml IRR 

ASDIRECTED PRN


   PRN Reason: delivery


Terbutaline Sulfate (Terbutaline 1 Mg/Ml Sdv)  0.25 mg SUBCUT ASDIRECTED PRN


   PRN Reason: Tacysystole











- Uterine Contractions


Uterine Monitoring Mode: External [place]


Contraction Intensity: Moderate


Uterine Resting Tone: Soft





- Fetal Monitoring


Fetal Monitor Mode: Doppler/Auscultation


Fetal Heart Rate (FHR) Variability: Moderate (6-25 bpm)


Fetal Accelerations: Present, 15x15


Fetal Decelerations: Intermittent (<50% x 20 min)


Fetal Strip Review: Category II





- Vaginal Exam


Dilation (cm): 3


Effacement (Percent): 70


Station: -2


Cervical Position: Posterior


Sterile Vaginal Exam Performed By: Arianna Jones





- Labor Progress (Free Text)


Labor Progress: 


Provider was called about vaginal bleeding.


She had an episode of minimal bleeding while on the medicine ball, that 

persisted for the next few minutes.


Patient has been off pitocin for some time, but still rubio frequently. 


Pain has gotten stronger and patient was requesting something for pain.  


Cat2 tracing: overall mod variability (with episodes of minimal variability) and

occasional shallow decels (<30%)





Provider reviewed the BPP performed earlier which stated posterior placenta 

without signs of previa.


On exam, provider felt some clot on the cervical os.  Bleeding had slowed down, 

just stains on the underlying pad.


SVE 3/70/-2 posterior





Meanwhile fetal status has remained unchanged, cat2 tracing (as decribed above).





Provider recc epidural, and pitocin PRN.


Patient agreeable with the plan.

## 2021-08-25 NOTE — PCM.PREANE
Preanesthetic Assessment





- Anesthesia/Transfusion/Family Hx


Anesthesia History: Prior Anesthesia Without Reaction


Family History of Anesthesia Reaction: No


Transfusion History: No Prior Transfusion(s)





- Review of Systems


General: No Symptoms


Pulmonary: No Symptoms


Cardiovascular: No Symptoms


Gastrointestinal: No Symptoms


Neurological: No Symptoms


Other: Reports: None





- Physical Assessment


Height: 5 ft 5 in


Weight: 194 lb


ASA Class: 2


Mental Status: Alert & Oriented x3


Airway Class: Mallampati = 3


Dentition: Reports: Normal Dentition


ROM/Head Extension: Full


Lungs: Clear to Auscultation, Normal Respiratory Effort


Cardiovascular: Regular Rate, Regular Rhythm





- Lab


Values: 





                             Laboratory Last Values











WBC  11.34 K/uL (4.0-11.0)  H  08/24/21  16:49    


 


RBC  4.21 M/uL (4.30-5.90)  L  08/24/21  16:49    


 


Hgb  11.7 g/dL (12.0-16.0)  L  08/24/21  16:49    


 


Hct  34.7 % (36.0-46.0)  L  08/24/21  16:49    


 


MCV  82.4 fL (80.0-98.0)   08/24/21  16:49    


 


MCH  27.8 pg (27.0-32.0)   08/24/21  16:49    


 


MCHC  33.7 g/dL (31.0-37.0)   08/24/21  16:49    


 


RDW Std Deviation  40.0 fl (28.0-62.0)   08/24/21  16:49    


 


RDW Coeff of Clau  13 % (11.0-15.0)   08/24/21  16:49    


 


Plt Count  208 K/uL (150-400)   08/24/21  16:49    


 


MPV  11.50 fL (7.40-12.00)   08/24/21  16:49    


 


Nucleated RBC %  0.0 /100WBC  08/24/21  16:49    


 


Nucleated RBCs #  0 K/uL  08/24/21  16:49    


 


SARS-CoV-2 RNA (JOSE ROBERTO)  NEGATIVE  (NEGATIVE)   08/24/21  16:54    


 


Blood Type  O POSITIVE   08/24/21  16:49    


 


Antibody Screen  NEGATIVE   08/24/21  16:49    














- Allergies


Allergies/Adverse Reactions: 


                                    Allergies











Allergy/AdvReac Type Severity Reaction Status Date / Time


 


amoxicillin Allergy  Other Verified 01/14/21 18:51


 


Penicillins Allergy  throat Verified 08/24/21 11:16





   closes  














- Blood


Blood Available: Yes


Product(s) Available: PRBC





- Anesthesia Plan


Pre-Op Medication Ordered: None





- Acknowledgements


Anesthesia Type Planned: Epidural


Pt an Appropriate Candidate for the Planned Anesthesia: Yes


Alternatives and Risks of Anesthesia Discussed w Pt/Guardian: Yes


Pt/Guardian Understands and Agrees with Anesthesia Plan: Yes





PreAnesthesia Questionnaire


HEENT History: Reports: Other (See Below)


Other HEENT History: wears glasses/contacts


Cardiovascular History: Reports: None


Respiratory History: Reports: None


Gastrointestinal History: Reports: None


Genitourinary History: Reports: None


OB/GYN History: Reports: Pregnancy


Musculoskeletal History: Reports: Fracture


Other Musculoskeletal History: hx fx toes, fingers, wrist, and nose


Neurological History: Reports: Migraines


Psychiatric History: Reports: Anxiety, Depression


Endocrine/Metabolic History: Reports: None


Hematologic History: Reports: None


Immunologic History: Reports: None


Oncologic (Cancer) History: Reports: None


Dermatologic History: Reports: Other (See Below)


Other Dermatologic History: gets hives





- Infectious Disease History


Infectious Disease History: Reports: None





- Past Surgical History


Head Surgeries/Procedures: Reports: None


HEENT Surgical History: Reports: Oral Surgery, Tonsillectomy





- SUBSTANCE USE


Tobacco Use Status *Q: Current Every Day Tobacco User


Tobacco Use Within Last Twelve Months: Vaping





- HOME MEDS


Home Medications: 


                                    Home Meds





Prenatal No122/Iron/Folic Acid [Prenatal Multi Tablet] 1 tab PO DAILY 01/14/21 

[History]











- CURRENT (IN HOUSE) MEDS


Current Meds: 





                               Current Medications





Butorphanol Tartrate (Butorphanol 1 Mg/Ml Sdv)  1 mg IVPUSH Q1H PRN


   PRN Reason: Pain (severe 7-10)


Carboprost Tromethamine (Carboprost Tromethamine 250 Mcg/1 Ml Amp)  250 mcg IM 

ASDIRECTED PRN


   PRN Reason: Post Partum Hemorrhage


Oxytocin/Sodium Chloride (Oxytocin 30 Unit/500 Ml-Ns)  30 unit in 500 mls @ 999 

mls/hr IV TITRATE AINSLEY


Tranexamic Acid 1,000 mg/ (Sodium Chloride)  110 mls @ 660 mls/hr IV ONETIME PRN


   PRN Reason: Bleeding


Oxytocin/Sodium Chloride (Oxytocin 30 Unit/500 Ml-Ns)  30 unit in 500 mls @ 2 

mls/hr IV TITRATE AINSLEY; Protocol


   Last Titration: 08/25/21 02:35 Dose:  0 munits/min, 0 mls/hr


   Documented by: 


Lactated Ringer's (Ringers, Lactated)  1,000 mls @ 150 mls/hr IV ASDIRECTED AINSLEY


   Last Admin: 08/25/21 04:06 Dose:  999 mls/hr


   Documented by: 


Lidocaine HCl (Lidocaine 1% 50 Ml Mdv)  50 ml INJECT ONETIME PRN


   PRN Reason: Laceration repair


Methylergonovine Maleate (Methylergonovine 0.2 Mg/1 Ml Amp)  0.2 mg IM 

ASDIRECTED PRN


   PRN Reason: Post Partum Hemorrhage


Misoprostol (Misoprostol 200 Mcg Tab)  200 mcg PO ONETIME PRN


   PRN Reason: Post Partum Hemorrhage


Misoprostol (Misoprostol 25 Mcg (1/4 Of 100 Mcg) Tab)  25 mcg VAG ONETIME PRN


   PRN Reason: Cervical Ripening


   Last Admin: 08/24/21 17:49 Dose:  25 mcg


   Documented by: 


Misoprostol (Misoprostol 25 Mcg (1/4 Of 100 Mcg) Tab)  25 mcg VAG Q4H PRN


   PRN Reason: Cervical Ripening


Misoprostol (Misoprostol 25 Mcg (1/4 Of 100 Mcg) Tab)  25 mcg PO ONETIME PRN


   PRN Reason: Cervical Ripening


   Last Admin: 08/24/21 17:49 Dose:  25 mcg


   Documented by: 


Misoprostol (Misoprostol 25 Mcg (1/4 Of 100 Mcg) Tab)  25 mcg PO Q4H PRN


   PRN Reason: Cervical Ripening


Nalbuphine HCl (Nalbuphine 10 Mg/1 Ml Vial)  10 mg IVPUSH Q1H PRN


   PRN Reason: Pain (severe 7-10)


Ondansetron HCl (Ondansetron 4 Mg/2 Ml Sdv)  4 mg IVPUSH Q4H PRN


   PRN Reason: Nausea/Vomiting


Sodium Chloride (Sodium Chloride 0.9% 10 Ml Syringe)  10 ml FLUSH ASDIRECTED PRN


   PRN Reason: Keep Vein Open


Sodium Chloride (Sodium Chloride 0.9% 2.5 Ml Syringe)  2.5 ml FLUSH ASDIRECTED 

PRN


   PRN Reason: Keep Vein Open


Sodium Chloride (Sodium Chloride 0.9% 10 Ml Sdv)  10 ml IV ASDIRECTED PRN


   PRN Reason: IV Use


Sterile Water (Water For Irrigation,Sterile 1,000 Ml Container)  1,000 ml IRR 

ASDIRECTED PRN


   PRN Reason: delivery


Terbutaline Sulfate (Terbutaline 1 Mg/Ml Sdv)  0.25 mg SUBCUT ASDIRECTED PRN


   PRN Reason: Tacysystole





Discontinued Medications





Bupivacaine HCl (Bupivacaine 0.25% 30 Ml Sdv) Confirm Administered Dose 30 ml 

.ROUTE .STK-MED ONE


   Stop: 08/25/21 04:10


Ropivacaine (Naropin 0.2%) Confirm Administered Dose 200 mls @ as directed 

.ROUTE .[employer]-MED ONE


   Stop: 08/25/21 04:09











- Pre-Procedure Checklist


Attending Provider Aware: Yes


Chart Reviewed: Yes


Consent Signed: Yes


Labs Reviewed: Yes


VS/FHR Reviewed: Yes


Patient Identification Confirmation Method: Reports: Verbal Patient


Pt an Appropriate Candidate for the Planned Anesthesia: Yes


Alternatives and Risks of Anesthesia Discussed w Pt/Guardian: Yes





- Procedure


Monitors in Place: Reports: Blood Pressure, Heart Rate, SPO2


Functional IV: Yes


Safety Measures: Reports: Patient Identified, Procedure Verified, Site Verified,

 Procedure Time Out


Patient Position: Reports: Sitting


Prep: Reports: Betadine x3, Sterile Drape


Local Anesthetic: Reports: Intradermal Wheal w Lidocaine 1%


Regional Placement Level: Reports: L3-4


Needle: Reports: 17 g Touhy


Approach: Reports: Midline


Technique: Reports: LEONEL Plastic Syringe


Parasthesia: Reports: None


Fluid Obtained: Reports: None


Test Dose Time: 04:22


Test Dose Medication: Reports: Lidocaine 1.5% w Epinephrine 1:200,000


Test Dose Response: Reports: Negative


Loading Dose Time: 04:20


Loading Dose Medication: bupivicaine 0.25% 10cc


Loading Dose Patient Position: sitting


Continuous Infusion Start Time: 04:25


Continuous Infusion Medication: ropivicaine 0.2%


Continuous Infusion Rate: 16


Continuous Infusion PCS Bolus Option: 4


Patient Position Post Placement: Reports: Supline/EAMON


VS and FHR Monitored in Unit Post Placement: Yes


Procedure End Date: 08/25/21


Procedure End Time: 05:11

## 2021-08-26 NOTE — OR
SURGEON:

Damian Montes MD

 

DATE OF PROCEDURE:  2021

 

Ms. Garay is 25 years old.  She is para 1-0-0-1.  She is status post normal

spontaneous vaginal delivery in her first pregnancy.  She is followed in our

practice jointly by myself and the nurse midwife.  The patient yesterday had

decreased fetal movement and nonreassuring fetal heart rate, although her

biophysical profile is acceptable.  She is 38 weeks.  The patient is offered the

possibility of induction.  She is elected to admit for induction.  She was

admitted for the induction.  She is induced with Cytotec and Pitocin, and she

responded to that very well.  At 7 o'clock this morning, she was 4 cm, complete,

vertex, and -2 station.  She had rupture of the membranes.  The amniotic fluid

is wine-colored and it is noted that the patient still has increased vaginal

bleeding.  However, the patient has continued to contract adequately.  Fetal

heart rate was category 1 through the entire process and then the patient became

complete complete, and after pushing for 7 minutes, the patient was able to

accomplish normal spontaneous vaginal delivery of a female fetus.  Apgar score

reported to be 8 and 9.  The weight is not available.  The placenta delivered

spontaneous, complete, and intact.  There was no perineal, labial, or vaginal

laceration.  Estimated blood loss is 300 to 350 mL.  There was no complication

in the labor and the delivery process of this patient.

 

 

ANATOLY / CLINT

DD:  2021 09:58:54

DT:  2021 15:59:13

Job #:  [id number]

## 2021-08-26 NOTE — [id number]
**Discharge Summary





- Hospital Course


Free Text/Narrative:: 





Discharge home with baby. Follow up in the clinic in 6 weeks for routine 

postpartum visit; sooner, if needed. 


Diagnosis: Stroke: No


Modified [place] Scale: No Symptoms at All


Modified [place] Scale Score: 0





- Discharge Data


Discharge Date: 21


Discharge Disposition: Admitted As Inpatient 66


Condition: Good





- Referral to Home Health


Primary Care Physician: 


PCP None








- Discharge Diagnosis/Problem(s)


(1)  (normal spontaneous vaginal delivery)


SNOMED Code(s): 44384542, [id number]


   ICD Code: O80 - ENCOUNTER FOR FULL-TERM UNCOMPLICATED DELIVERY   Status: 

Acute   Priority: High   Current Visit: No   





- Patient Instructions


Diet: Regular Diet as Tolerated, Drink 8-10+ Glasses/Day


Activity: As Tolerated, No Strenuous Activities, Rest and Relax Today


Driving: May Drive Today


Showering/Bathing: May Shower


Notify Provider of: Fever, Increased Pain, Swelling and Redness, Drainage, 

Nausea and/or Vomiting





- Discharge Plan


*PRESCRIPTION DRUG MONITORING PROGRAM REVIEWED*: Not Applicable


*COPY OF PRESCRIPTION DRUG MONITORING REPORT IN PATIENT IRVIN: Not Applicable


Prescriptions/Med Rec: 


Ibuprofen [Motrin] 800 mg PO Q6H PRN #90 tablet


 PRN Reason: Pain (Mild 1-3)


Home Medications: 


                                    Home Meds





Prenatal No122/Iron/Folic Acid [Prenatal Multi Tablet] 1 tab PO DAILY 21 

[History]


Ibuprofen [Motrin] 800 mg PO Q6H PRN #90 tablet 21 [Rx]








Oxygen Therapy Mode: Room Air





- Discharge Summary/Plan Comment


DC Time >30 min.: Yes


Total # of Minutes for Discharge Time: 





n/a





- General Info


Date of Service: 21


Functional Status: Reports: Pain Controlled, Tolerating Diet, Ambulating, 

Urinating





- Review of Systems


General: Reports: No Symptoms


HEENT: Reports: No Symptoms


Pulmonary: Reports: No Symptoms


Cardiovascular: Reports: No Symptoms


Gastrointestinal: Reports: No Symptoms


Genitourinary: Reports: No Symptoms


Musculoskeletal: Reports: No Symptoms


Skin: Reports: No Symptoms


Neurological: Reports: No Symptoms


Psychiatric: Reports: No Symptoms





- Patient Data


Vitals - Most Recent: 


                                Last Vital Signs











Temp  97.3 F   21 07:00


 


Pulse  75   21 07:00


 


Resp  17   21 07:00


 


BP  132/78   21 07:00


 


Pulse Ox  97   21 07:00











Weight - Most Recent: 194 lb


Lab Results - Last 24 hrs: 


                         Laboratory Results - last 24 hr











  21 Range/Units





  04:54 


 


Hgb  10.3 L  (12.0-16.0)  g/dL


 


Hct  31.2 L  (36.0-46.0)  %











Med Orders - Current: 


                               Current Medications





Acetaminophen (Acetaminophen 500 Mg Tab)  500 mg PO Q4H PRN


   PRN Reason: Pain (mild 1-3)


Acetaminophen (Acetaminophen 500 Mg Tab)  1,000 mg PO Q4H PRN


   PRN Reason: Pain (mild 1-3)


   Last Admin: 21 06:53 Dose:  1,000 mg


   Documented by: 


Benzocaine/Menthol (Benzocaine/Menthol 20%-0.5% Spray 78 Gm Cannister)  78 gm 

TOP ASDIRECTED PRN


   PRN Reason: Perineal Comfort Measure


   Last Admin: 21 13:18 Dose:  1 canister


   Documented by: 


Bisacodyl (Bisacodyl 10 Mg Supp)  10 mg RECTAL ONETIME PRN


   PRN Reason: Constipation


Butorphanol Tartrate (Butorphanol 1 Mg/Ml Sdv)  1 mg IVPUSH Q1H PRN


   PRN Reason: Pain (severe 7-10)


Carboprost Tromethamine (Carboprost Tromethamine 250 Mcg/1 Ml Amp)  250 mcg IM A

SDIRECTED PRN


   PRN Reason: Post Partum Hemorrhage


Docusate Sodium (Docusate Sodium 100 Mg Cap)  100 mg PO Q12H PRN


   PRN Reason: Constipation


   Last Admin: 21 08:11 Dose:  100 mg


   Documented by: 


Emollient Ointment (Lanolin 100% Cream 7 Gm Tube)  0 gm TOP ASDIRECTED PRN


   PRN Reason: Sore Nipples


   Last Admin: 21 01:35 Dose:  7 gm


   Documented by: 


Ephedrine Sulfate (Ephedrine 50 Mg/Ml Sdv)  10 mg IVPUSH Q5M PRN


   PRN Reason: Hypotension


Oxytocin/Sodium Chloride (Oxytocin 30 Unit/500 Ml-Ns)  30 unit in 500 mls @ 999 

mls/hr IV TITRATE AINSLEY


Tranexamic Acid 1,000 mg/ (Sodium Chloride)  110 mls @ 660 mls/hr IV ONETIME PRN


   PRN Reason: Bleeding


Oxytocin/Sodium Chloride (Oxytocin 30 Unit/500 Ml-Ns)  30 unit in 500 mls @ 2 

mls/hr IV TITRATE AINSLEY; Protocol


   Last Titration: 21 07:30 Dose:  6 munits/min, 6 mls/hr


   Documented by: 


Lactated Ringer's (Ringers, Lactated)  1,000 mls @ 150 mls/hr IV ASDIRECTED AINSLEY


   Last Admin: 21 07:20 Dose:  150 mls/hr


   Documented by: 


Ibuprofen (Ibuprofen 400 Mg Tab)  400 mg PO Q4H PRN


   PRN Reason: Pain (mild 1-3)


Ibuprofen (Ibuprofen 800 Mg Tab)  800 mg PO Q6H PRN


   PRN Reason: Pain (mild 1-3)


   Last Admin: 21 13:17 Dose:  800 mg


   Documented by: 


Lidocaine HCl (Lidocaine 1% 50 Ml Mdv)  50 ml INJECT ONETIME PRN


   PRN Reason: Laceration repair


Methylergonovine Maleate (Methylergonovine 0.2 Mg/1 Ml Amp)  0.2 mg IM 

ASDIRECTED PRN


   PRN Reason: Post Partum Hemorrhage


Misoprostol (Misoprostol 200 Mcg Tab)  200 mcg PO ONETIME PRN


   PRN Reason: Post Partum Hemorrhage


Misoprostol (Misoprostol 25 Mcg (1/4 Of 100 Mcg) Tab)  25 mcg VAG ONETIME PRN


   PRN Reason: Cervical Ripening


   Last Admin: 21 17:49 Dose:  25 mcg


   Documented by: 


Misoprostol (Misoprostol 25 Mcg (1/4 Of 100 Mcg) Tab)  25 mcg VAG Q4H PRN


   PRN Reason: Cervical Ripening


Misoprostol (Misoprostol 25 Mcg (1/4 Of 100 Mcg) Tab)  25 mcg PO ONETIME PRN


   PRN Reason: Cervical Ripening


   Last Admin: 21 17:49 Dose:  25 mcg


   Documented by: 


Misoprostol (Misoprostol 25 Mcg (1/4 Of 100 Mcg) Tab)  25 mcg PO Q4H PRN


   PRN Reason: Cervical Ripening


Nalbuphine HCl (Nalbuphine 10 Mg/1 Ml Vial)  10 mg IVPUSH Q1H PRN


   PRN Reason: Pain (severe 7-10)


Ondansetron HCl (Ondansetron 4 Mg/2 Ml Sdv)  4 mg IVPUSH Q4H PRN


   PRN Reason: Nausea/Vomiting


   Last Admin: 21 07:25 Dose:  4 mg


   Documented by: 


Oxycodone HCl (Oxycodone 5 Mg Tab)  5 mg PO Q2H PRN


   PRN Reason: Pain (severe 7-10)


Sodium Chloride (Sodium Chloride 0.9% 10 Ml Syringe)  10 ml FLUSH ASDIRECTED PRN


   PRN Reason: Keep Vein Open


Sodium Chloride (Sodium Chloride 0.9% 2.5 Ml Syringe)  2.5 ml FLUSH ASDIRECTED 

PRN


   PRN Reason: Keep Vein Open


Sodium Chloride (Sodium Chloride 0.9% 10 Ml Sdv)  10 ml IV ASDIRECTED PRN


   PRN Reason: IV Use


Sterile Water (Water For Irrigation,Sterile 1,000 Ml Container)  1,000 ml IRR 

ASDIRECTED PRN


   PRN Reason: delivery


Terbutaline Sulfate (Terbutaline 1 Mg/Ml Sdv)  0.25 mg SUBCUT ASDIRECTED PRN


   PRN Reason: Tacysystole


Witch Hazel (Witch Hazel Medicated Pads 40/Jar)  1 pad TOP ASDIRECTED PRN


   PRN Reason: comfort care


   Last Admin: 21 13:18 Dose:  1 tub


   Documented by: 





Discontinued Medications





Bupivacaine HCl (Bupivacaine 0.25% 30 Ml Sdv) Confirm Administered Dose 30 ml 

.ROUTE .STK-MED ONE


   Stop: 21 04:10


   Last Admin: 21 07:07 Dose:  Not Given


   Documented by: 


Ropivacaine (Naropin 0.2%) Confirm Administered Dose 200 mls @ as directed 

.ROUTE .STK-MED ONE


   Stop: 21 04:09


   Last Admin: 21 07:06 Dose:  Not Given


   Documented by: 











- Exam


General: Reports: Alert, Oriented, Cooperative, No Acute Distress


Lungs: Reports: Normal Respiratory Effort


Cardiovascular: Reports: Regular Rate, Regular Rhythm


GI/Abdominal Exam: Soft, Non-Tender


 (Female) Exam: Deferred


Rectal (Female) Exam: Deferred


Back Exam: Reports: Normal Inspection, Full Range of Motion


Extremities: Normal Inspection, Normal Range of Motion, Normal Capillary Refill


Skin: Reports: Warm, Dry, Intact


Neurological: Reports: No New Focal Deficit, Normal Speech, Normal Tone, 

Sensation Intact


Psy/Mental Status: Reports: Alert, Normal Affect, Normal Mood

## 2021-08-26 NOTE — PCM.PNPP
- General Info


Date of Service: 21


Admission Dx/Problem (Free Text): 


                           Admission Diagnosis/Problem











Admission Diagnosis/Problem    














Functional Status: Reports: Pain Controlled, Tolerating Diet, Ambulating, 

Urinating





- Review of Systems


General: Reports: No Symptoms


HEENT: Reports: No Symptoms


Pulmonary: Reports: No Symptoms


Cardiovascular: Reports: No Symptoms


Gastrointestinal: Reports: No Symptoms


Genitourinary: Reports: No Symptoms


Musculoskeletal: Reports: No Symptoms


Skin: Reports: No Symptoms


Neurological: Reports: No Symptoms


Psychiatric: Reports: No Symptoms





- General Info


Date of Service: 21





- Patient Data


Vital Signs - Most Recent: 


                                Last Vital Signs











Temp  97.3 F   21 07:00


 


Pulse  75   21 07:00


 


Resp  17   21 07:00


 


BP  132/78   21 07:00


 


Pulse Ox  97   21 07:00











Weight - Most Recent: 194 lb


Lab Results - Last 24 Hours: 


                         Laboratory Results - last 24 hr











  21 Range/Units





  04:54 


 


Hgb  10.3 L  (12.0-16.0)  g/dL


 


Hct  31.2 L  (36.0-46.0)  %











Med Orders - Current: 


                               Current Medications





Acetaminophen (Acetaminophen 500 Mg Tab)  500 mg PO Q4H PRN


   PRN Reason: Pain (mild 1-3)


Acetaminophen (Acetaminophen 500 Mg Tab)  1,000 mg PO Q4H PRN


   PRN Reason: Pain (mild 1-3)


   Last Admin: 21 06:53 Dose:  1,000 mg


   Documented by: 


Benzocaine/Menthol (Benzocaine/Menthol 20%-0.5% Spray 78 Gm Cannister)  78 gm 

TOP ASDIRECTED PRN


   PRN Reason: Perineal Comfort Measure


   Last Admin: 21 13:18 Dose:  1 canister


   Documented by: 


Bisacodyl (Bisacodyl 10 Mg Supp)  10 mg RECTAL ONETIME PRN


   PRN Reason: Constipation


Butorphanol Tartrate (Butorphanol 1 Mg/Ml Sdv)  1 mg IVPUSH Q1H PRN


   PRN Reason: Pain (severe 7-10)


Carboprost Tromethamine (Carboprost Tromethamine 250 Mcg/1 Ml Amp)  250 mcg IM 

ASDIRECTED PRN


   PRN Reason: Post Partum Hemorrhage


Docusate Sodium (Docusate Sodium 100 Mg Cap)  100 mg PO Q12H PRN


   PRN Reason: Constipation


   Last Admin: 21 08:11 Dose:  100 mg


   Documented by: 


Emollient Ointment (Lanolin 100% Cream 7 Gm Tube)  0 gm TOP ASDIRECTED PRN


   PRN Reason: Sore Nipples


   Last Admin: 21 01:35 Dose:  7 gm


   Documented by: 


Ephedrine Sulfate (Ephedrine 50 Mg/Ml Sdv)  10 mg IVPUSH Q5M PRN


   PRN Reason: Hypotension


Oxytocin/Sodium Chloride (Oxytocin 30 Unit/500 Ml-Ns)  30 unit in 500 mls @ 999 

mls/hr IV TITRATE AINSLEY


Tranexamic Acid 1,000 mg/ (Sodium Chloride)  110 mls @ 660 mls/hr IV ONETIME PRN


   PRN Reason: Bleeding


Oxytocin/Sodium Chloride (Oxytocin 30 Unit/500 Ml-Ns)  30 unit in 500 mls @ 2 

mls/hr IV TITRATE AINSLEY; Protocol


   Last Titration: 21 07:30 Dose:  6 munits/min, 6 mls/hr


   Documented by: 


Lactated Ringer's (Ringers, Lactated)  1,000 mls @ 150 mls/hr IV ASDIRECTED AINSLEY


   Last Admin: 21 07:20 Dose:  150 mls/hr


   Documented by: 


Ibuprofen (Ibuprofen 400 Mg Tab)  400 mg PO Q4H PRN


   PRN Reason: Pain (mild 1-3)


Ibuprofen (Ibuprofen 800 Mg Tab)  800 mg PO Q6H PRN


   PRN Reason: Pain (mild 1-3)


   Last Admin: 21 13:17 Dose:  800 mg


   Documented by: 


Lidocaine HCl (Lidocaine 1% 50 Ml Mdv)  50 ml INJECT ONETIME PRN


   PRN Reason: Laceration repair


Methylergonovine Maleate (Methylergonovine 0.2 Mg/1 Ml Amp)  0.2 mg IM 

ASDIRECTED PRN


   PRN Reason: Post Partum Hemorrhage


Misoprostol (Misoprostol 200 Mcg Tab)  200 mcg PO ONETIME PRN


   PRN Reason: Post Partum Hemorrhage


Misoprostol (Misoprostol 25 Mcg (1/4 Of 100 Mcg) Tab)  25 mcg VAG ONETIME PRN


   PRN Reason: Cervical Ripening


   Last Admin: 21 17:49 Dose:  25 mcg


   Documented by: 


Misoprostol (Misoprostol 25 Mcg (1/4 Of 100 Mcg) Tab)  25 mcg VAG Q4H PRN


   PRN Reason: Cervical Ripening


Misoprostol (Misoprostol 25 Mcg (1/4 Of 100 Mcg) Tab)  25 mcg PO ONETIME PRN


   PRN Reason: Cervical Ripening


   Last Admin: 21 17:49 Dose:  25 mcg


   Documented by: 


Misoprostol (Misoprostol 25 Mcg (1/4 Of 100 Mcg) Tab)  25 mcg PO Q4H PRN


   PRN Reason: Cervical Ripening


Nalbuphine HCl (Nalbuphine 10 Mg/1 Ml Vial)  10 mg IVPUSH Q1H PRN


   PRN Reason: Pain (severe 7-10)


Ondansetron HCl (Ondansetron 4 Mg/2 Ml Sdv)  4 mg IVPUSH Q4H PRN


   PRN Reason: Nausea/Vomiting


   Last Admin: 21 07:25 Dose:  4 mg


   Documented by: 


Oxycodone HCl (Oxycodone 5 Mg Tab)  5 mg PO Q2H PRN


   PRN Reason: Pain (severe 7-10)


Sodium Chloride (Sodium Chloride 0.9% 10 Ml Syringe)  10 ml FLUSH ASDIRECTED PRN


   PRN Reason: Keep Vein Open


Sodium Chloride (Sodium Chloride 0.9% 2.5 Ml Syringe)  2.5 ml FLUSH ASDIRECTED 

PRN


   PRN Reason: Keep Vein Open


Sodium Chloride (Sodium Chloride 0.9% 10 Ml Sdv)  10 ml IV ASDIRECTED PRN


   PRN Reason: IV Use


Sterile Water (Water For Irrigation,Sterile 1,000 Ml Container)  1,000 ml IRR 

ASDIRECTED PRN


   PRN Reason: delivery


Terbutaline Sulfate (Terbutaline 1 Mg/Ml Sdv)  0.25 mg SUBCUT ASDIRECTED PRN


   PRN Reason: Tacysystole


Witch Hazel (Witch Hazel Medicated Pads 40/Jar)  1 pad TOP ASDIRECTED PRN


   PRN Reason: comfort care


   Last Admin: 21 13:18 Dose:  1 tub


   Documented by: 





Discontinued Medications





Bupivacaine HCl (Bupivacaine 0.25% 30 Ml Sdv) Confirm Administered Dose 30 ml 

.ROUTE .STK-MED ONE


   Stop: 21 04:10


   Last Admin: 21 07:07 Dose:  Not Given


   Documented by: 


Ropivacaine (Naropin 0.2%) Confirm Administered Dose 200 mls @ as directed 

.ROUTE .STK-MED ONE


   Stop: 21 04:09


   Last Admin: 21 07:06 Dose:  Not Given


   Documented by: 











- Infant Interaction


Infant Disposition, Postpartum:  in Room with Family


Infant Interaction: Holding Infant


Infant Feeding:  Infant; Nursed Well


Support Person: , Mother





- Postpartum Recovery Exam


Fundal Tone: Firm


Fundal Level: 3 Fingerbreadths Below Umbilicus


Fundal Placement: Midline


Lochia Amount: Scant


Lochia Color: Rubra/Red


Perineum Description: Intact, Minimal Bruising/Swelling


Episiotomy/Laceration: None


Bladder Status: Voiding


Urinary Elimination: Voided





- Exam


General: Alert, Oriented, Cooperative, No Acute Distress


Lungs: Normal Respiratory Effort


Cardiovascular: Regular Rate, Regular Rhythm


GI/Abdominal Exam: Soft, Non-Tender


Extremities: Normal Inspection, Non-Tender, Normal Capillary Refill


Skin: Warm, Dry, Intact


Neurological: No New Focal Deficit, Normal Speech, Normal Tone, Strength Equal 

Bilateral, Sensation Intact


Psy/Mental Status: Alert, Normal Affect, Normal Mood





- Problem List & Annotations


(1)  (normal spontaneous vaginal delivery)


SNOMED Code(s): 89444843, [id number]


   Code(s): O80 - ENCOUNTER FOR FULL-TERM UNCOMPLICATED DELIVERY   Status: Acute

  Priority: High   Current Visit: No   





- Problem List Review


Problem List Initiated/Reviewed/Updated: Yes





- Plan


Plan:: 


 at 37 6/7 weeks admitted for induction of labor secondary to decreased 

fetal movement, abnormal NST and BPP of 6/8.  


O+, Rubella immune, GBS negative.


Cisneros score of 3. 


Will start with cervical ripening.


Discussed risks, benefits and off label use.


Will use pitocin PRN.


Epidural PRN








PP Day 1


A: Doing well; reports breastfeeding and bonding well with baby.  Has been 

supplementing after feedings.  Pain well-controlled, ambulating, urinating, 

tolerating diet.  NO concerns/questions at this time. 


P: Anticipate discharge today pending  labs; Dr. Montes updated.

## 2021-08-27 NOTE — [id number]
Post Anesthesia Note





- EVALUATION WITHIN 48HRS OF ANESTHETIC


Vital Signs in Normal Range: Yes


Patient Participated in Evaluation: Yes


Respiratory Function Stable: Yes


Airway Patent: Yes


Cardiovascular Function Stable: Yes


Hydration Status Stable: Yes


Pain Control Satisfactory: Yes


Nausea and Vomiting Control Satisfactory: Yes


Mental Status Recovered: Yes


Vital Signs: 


                                Last Vital Signs











Temp  97.3 F   08/26/21 07:00


 


Pulse  75   08/26/21 07:00


 


Resp  17   08/26/21 07:00


 


BP  132/78   08/26/21 07:00


 


Pulse Ox  97   08/26/21 07:00

## 2021-08-27 NOTE — PCM.POSTAN
POST ANESTHESIA ASSESSMENT





- MENTAL STATUS


Mental Status: Alert, Oriented





- VITAL SIGNS


Vital Signs: 


                                Last Vital Signs











Temp  97.3 F   08/26/21 07:00


 


Pulse  75   08/26/21 07:00


 


Resp  17   08/26/21 07:00


 


BP  132/78   08/26/21 07:00


 


Pulse Ox  97   08/26/21 07:00














- RESPIRATORY


Respiratory Status: Respiratory Rate WNL, Airway Patent, O2 Saturation Stable





- CARDIOVASCULAR


CV Status: Pulse Rate WNL, Blood Pressure Stable





- GASTROINTESTINAL


GI Status: No Symptoms





- POST OP HYDRATION


Hydration Status: Adequate & Stable